# Patient Record
Sex: FEMALE | Race: WHITE | NOT HISPANIC OR LATINO
[De-identification: names, ages, dates, MRNs, and addresses within clinical notes are randomized per-mention and may not be internally consistent; named-entity substitution may affect disease eponyms.]

---
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
## 2023-05-23 ENCOUNTER — APPOINTMENT (OUTPATIENT)
Dept: PEDIATRICS | Facility: CLINIC | Age: 18
End: 2023-05-23
Payer: COMMERCIAL

## 2023-05-23 VITALS
HEART RATE: 78 BPM | SYSTOLIC BLOOD PRESSURE: 114 MMHG | BODY MASS INDEX: 26.79 KG/M2 | DIASTOLIC BLOOD PRESSURE: 64 MMHG | OXYGEN SATURATION: 99 % | HEIGHT: 56.25 IN | WEIGHT: 120.8 LBS

## 2023-05-23 PROCEDURE — 99173 VISUAL ACUITY SCREEN: CPT | Mod: 59

## 2023-05-23 PROCEDURE — 96127 BRIEF EMOTIONAL/BEHAV ASSMT: CPT

## 2023-05-23 PROCEDURE — 90471 IMMUNIZATION ADMIN: CPT

## 2023-05-23 PROCEDURE — 92551 PURE TONE HEARING TEST AIR: CPT

## 2023-05-23 PROCEDURE — 99395 PREV VISIT EST AGE 18-39: CPT | Mod: 25

## 2023-05-23 PROCEDURE — 90715 TDAP VACCINE 7 YRS/> IM: CPT

## 2023-05-23 PROCEDURE — 96160 PT-FOCUSED HLTH RISK ASSMT: CPT | Mod: 59

## 2023-05-23 NOTE — DISCUSSION/SUMMARY
[FreeTextEntry1] : Continue and/or try to have a balanced diet with all food groups. Brush teeth twice a day with toothbrush. Recommend visit to dentist. Maintain consistent daily routines and sleep schedule. Risky behaviors assessed. School discussed. Try to limit screen time to no more than 2 hours per day. Encourage physical activity.\par Return 1 year for routine well visit check.\par coordination of care reviewed\par 5-2-1-0 reviewed\par cardiac checklist -reviewed\par CRAFFT screening was reviewed\par

## 2023-05-23 NOTE — HISTORY OF PRESENT ILLNESS
[Yes] : Patient goes to dentist yearly [Up to date] : Up to date [Normal] : normal [Eats meals with family] : eats meals with family [Sleep Concerns] : no sleep concerns [Normal Performance] : normal performance [Eats regular meals including adequate fruits and vegetables] : eats regular meals including adequate fruits and vegetables [Drinks non-sweetened liquids] : drinks non-sweetened liquids  [Has concerns about body or appearance] : does not have concerns about body or appearance [At least 1 hour of physical activity a day] : at least 1 hour of physical activity a day [Uses electronic nicotine delivery system] : does not use electronic nicotine delivery system [Uses tobacco] : does not use tobacco [Uses drugs] : does not use drugs  [Drinks alcohol] : does not drink alcohol [No] : No cigarette smoke exposure [Has problems with sleep] : does not have problems with sleep [Gets depressed, anxious, or irritable/has mood swings] : does not get depressed, anxious, or irritable/has mood swings [Has thought about hurting self or considered suicide] : has not thought about hurting self or considered suicide [FreeTextEntry7] : 19 y/o C  [de-identified] : discussed with patient [FreeTextEntry1] : parent/patient denies- night sweats, night pains,  unexplained weight loss, headache, chest pain, SOB, loss of energy, chronic joint pains\par patient has normal urine output and stooling\par

## 2023-06-02 NOTE — HISTORY OF PRESENT ILLNESS
Please advise [de-identified] : weight check  pt doing well per pt mom would like RX for blood work  [FreeTextEntry6] : has been able to maintain her weight at school- does exercise a lot due to roomate does , is trying to get protein into diet- not always easy at college\par no diarrhea, vomiting, menses normal

## 2024-05-14 ENCOUNTER — APPOINTMENT (OUTPATIENT)
Dept: PEDIATRICS | Facility: CLINIC | Age: 19
End: 2024-05-14
Payer: COMMERCIAL

## 2024-05-14 VITALS
HEIGHT: 66.25 IN | WEIGHT: 122.9 LBS | HEART RATE: 61 BPM | BODY MASS INDEX: 19.75 KG/M2 | SYSTOLIC BLOOD PRESSURE: 112 MMHG | DIASTOLIC BLOOD PRESSURE: 64 MMHG | OXYGEN SATURATION: 99 %

## 2024-05-14 DIAGNOSIS — Z00.00 ENCOUNTER FOR GENERAL ADULT MEDICAL EXAMINATION W/OUT ABNORMAL FINDINGS: ICD-10-CM

## 2024-05-14 PROCEDURE — 96127 BRIEF EMOTIONAL/BEHAV ASSMT: CPT

## 2024-05-14 PROCEDURE — 96160 PT-FOCUSED HLTH RISK ASSMT: CPT | Mod: 59

## 2024-05-14 PROCEDURE — 92551 PURE TONE HEARING TEST AIR: CPT

## 2024-05-14 PROCEDURE — 99173 VISUAL ACUITY SCREEN: CPT | Mod: 59

## 2024-05-14 PROCEDURE — 99395 PREV VISIT EST AGE 18-39: CPT

## 2024-05-14 NOTE — HISTORY OF PRESENT ILLNESS
[Up to date] : Up to date [Normal] : normal [Eats meals with family] : eats meals with family [Sleep Concerns] : no sleep concerns [Normal Performance] : normal performance [Eats regular meals including adequate fruits and vegetables] : eats regular meals including adequate fruits and vegetables [Has concerns about body or appearance] : does not have concerns about body or appearance [At least 1 hour of physical activity a day] : at least 1 hour of physical activity a day [Uses electronic nicotine delivery system] : does not use electronic nicotine delivery system [Uses tobacco] : does not use tobacco [Uses drugs] : does not use drugs  [Drinks alcohol] : does not drink alcohol [No] : No cigarette smoke exposure [Yes] : Patient has had sexual intercourse. 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

## 2024-05-14 NOTE — DISCUSSION/SUMMARY
[] : The components of the vaccine(s) to be administered today are listed in the plan of care. The disease(s) for which the vaccine(s) are intended to prevent and the risks have been discussed with the caretaker.  The risks are also included in the appropriate vaccination information statements which have been provided to the patient's caregiver.  The caregiver has given consent to vaccinate. [FreeTextEntry1] : Continue and/or try to have a balanced diet with all food groups. Brush teeth twice a day with toothbrush. Recommend visit to dentist. Maintain consistent daily routines and sleep schedule. Risky behaviors assessed. School discussed. Try to limit screen time to no more than 2 hours per day. Encourage physical activity. Return 1 year for routine well visit check. coordination of care reviewed 5-2-1-0 reviewed cardiac checklist -reviewed CRAFFT screening was reviewed

## 2024-06-02 ENCOUNTER — OFFICE (OUTPATIENT)
Dept: URBAN - METROPOLITAN AREA CLINIC 12 | Facility: CLINIC | Age: 19
Setting detail: OPHTHALMOLOGY
End: 2024-06-02
Payer: COMMERCIAL

## 2024-06-02 DIAGNOSIS — H52.13: ICD-10-CM

## 2024-06-02 PROCEDURE — SCREF LASIK EVAL: Performed by: OPHTHALMOLOGY

## 2024-06-02 ASSESSMENT — LID EXAM ASSESSMENTS
OS_BLEPHARITIS: T
OD_BLEPHARITIS: T

## 2024-06-02 ASSESSMENT — CONFRONTATIONAL VISUAL FIELD TEST (CVF)
OD_FINDINGS: FULL
OS_FINDINGS: FULL

## 2024-06-13 ENCOUNTER — OFFICE (OUTPATIENT)
Dept: URBAN - METROPOLITAN AREA CLINIC 12 | Facility: CLINIC | Age: 19
Setting detail: OPHTHALMOLOGY
End: 2024-06-13
Payer: COMMERCIAL

## 2024-06-13 DIAGNOSIS — H52.13: ICD-10-CM

## 2024-06-13 PROCEDURE — SCREF LASIK EVAL: Performed by: OPHTHALMOLOGY

## 2024-06-13 ASSESSMENT — LID EXAM ASSESSMENTS
OD_BLEPHARITIS: T
OS_BLEPHARITIS: T

## 2024-06-13 ASSESSMENT — CONFRONTATIONAL VISUAL FIELD TEST (CVF)
OS_FINDINGS: FULL
OD_FINDINGS: FULL

## 2024-06-19 ENCOUNTER — OTHER LOCATION (OUTPATIENT)
Dept: URBAN - METROPOLITAN AREA LASIK CENTER 6 | Facility: LASIK CENTER | Age: 19
Setting detail: OPHTHALMOLOGY
End: 2024-06-19

## 2024-06-19 ENCOUNTER — RX ONLY (RX ONLY)
Age: 19
End: 2024-06-19

## 2024-06-19 DIAGNOSIS — H52.13: ICD-10-CM

## 2024-06-19 PROCEDURE — 65760 KERATOMILEUSIS: CPT | Mod: RT,LT | Performed by: OPHTHALMOLOGY

## 2024-06-20 ENCOUNTER — OFFICE (OUTPATIENT)
Dept: URBAN - METROPOLITAN AREA CLINIC 12 | Facility: CLINIC | Age: 19
Setting detail: OPHTHALMOLOGY
End: 2024-06-20
Payer: COMMERCIAL

## 2024-06-20 DIAGNOSIS — H52.13: ICD-10-CM

## 2024-06-20 PROCEDURE — 99024 POSTOP FOLLOW-UP VISIT: CPT | Performed by: OPHTHALMOLOGY

## 2024-06-20 ASSESSMENT — LID EXAM ASSESSMENTS
OD_BLEPHARITIS: T
OS_BLEPHARITIS: T

## 2024-06-20 ASSESSMENT — CONFRONTATIONAL VISUAL FIELD TEST (CVF)
OS_FINDINGS: FULL
OD_FINDINGS: FULL

## 2024-06-26 ENCOUNTER — OFFICE (OUTPATIENT)
Dept: URBAN - METROPOLITAN AREA CLINIC 12 | Facility: CLINIC | Age: 19
Setting detail: OPHTHALMOLOGY
End: 2024-06-26
Payer: COMMERCIAL

## 2024-06-26 DIAGNOSIS — H52.13: ICD-10-CM

## 2024-06-26 PROCEDURE — 99024 POSTOP FOLLOW-UP VISIT: CPT | Performed by: OPHTHALMOLOGY

## 2024-06-26 ASSESSMENT — CONFRONTATIONAL VISUAL FIELD TEST (CVF)
OD_FINDINGS: FULL
OS_FINDINGS: FULL

## 2024-06-26 ASSESSMENT — LID EXAM ASSESSMENTS
OD_BLEPHARITIS: T
OS_BLEPHARITIS: T

## 2024-07-21 ENCOUNTER — OFFICE (OUTPATIENT)
Dept: URBAN - METROPOLITAN AREA CLINIC 12 | Facility: CLINIC | Age: 19
Setting detail: OPHTHALMOLOGY
End: 2024-07-21
Payer: COMMERCIAL

## 2024-07-21 DIAGNOSIS — H52.13: ICD-10-CM

## 2024-07-21 PROCEDURE — 99024 POSTOP FOLLOW-UP VISIT: CPT | Performed by: OPHTHALMOLOGY

## 2024-07-21 ASSESSMENT — CONFRONTATIONAL VISUAL FIELD TEST (CVF)
OS_FINDINGS: FULL
OD_FINDINGS: FULL

## 2024-07-21 ASSESSMENT — LID EXAM ASSESSMENTS
OD_BLEPHARITIS: T
OS_BLEPHARITIS: T

## 2025-01-06 ENCOUNTER — OFFICE (OUTPATIENT)
Dept: URBAN - METROPOLITAN AREA CLINIC 12 | Facility: CLINIC | Age: 20
Setting detail: OPHTHALMOLOGY
End: 2025-01-06
Payer: COMMERCIAL

## 2025-01-06 ENCOUNTER — RX ONLY (RX ONLY)
Age: 20
End: 2025-01-06

## 2025-01-06 DIAGNOSIS — H52.13: ICD-10-CM

## 2025-01-06 PROCEDURE — 99024 POSTOP FOLLOW-UP VISIT: CPT | Performed by: OPHTHALMOLOGY

## 2025-01-06 ASSESSMENT — PACHYMETRY
OS_CT_CORRECTION: 1
OS_CT_UM: 535
OD_CT_CORRECTION: 1
OD_CT_UM: 525

## 2025-01-06 ASSESSMENT — REFRACTION_AUTOREFRACTION
OD_SPHERE: -2.25
OD_CYLINDER: +0.25
OD_AXIS: 108
OD_CYLINDER: SPHERE
OS_SPHERE: -2.00
OS_CYLINDER: -0.25
OD_SPHERE: -0.50
OS_SPHERE: +0.25
OS_CYLINDER: +0.25
OS_AXIS: 073
OD_AXIS: 000
OS_AXIS: 78

## 2025-01-06 ASSESSMENT — REFRACTION_MANIFEST
OD_VA1: 20/20
OD_AXIS: 170
OD_CYLINDER: -0.25
OD_SPHERE: -4.00
OD_AXIS: 180
OS_SPHERE: -3.75
OD_SPHERE: -4.00
OD_CYLINDER: -0.50
OS_AXIS: 000
OS_VA1: 20/20
OD_VA1: 20/20
OS_VA1: 20/20
OS_CYLINDER: SPHERE
OS_CYLINDER: -0.50
OS_SPHERE: -3.75
OS_AXIS: 015

## 2025-01-06 ASSESSMENT — KERATOMETRY
METHOD_AUTO_MANUAL: AUTO
OS_K2POWER_DIOPTERS: 41.00
OS_K1POWER_DIOPTERS: 40.75
OD_AXISANGLE_DEGREES: 092
OS_AXISANGLE_DEGREES: 104
OD_K1POWER_DIOPTERS: 40.25
OD_K2POWER_DIOPTERS: 40.50

## 2025-01-06 ASSESSMENT — VISUAL ACUITY
OD_BCVA: 20/20
OS_BCVA: 20/20-

## 2025-01-06 ASSESSMENT — CONFRONTATIONAL VISUAL FIELD TEST (CVF)
OD_FINDINGS: FULL
OS_FINDINGS: FULL

## 2025-01-06 ASSESSMENT — REFRACTION_CURRENTRX
OS_CYLINDER: SPHERE
OS_SPHERE: -3.50
OS_AXIS: 0
OD_AXIS: 0
OD_CYLINDER: SPHERE
OD_VPRISM_DIRECTION: SV
OS_VPRISM_DIRECTION: SV
OS_OVR_VA: 20/
OD_OVR_VA: 20/
OD_SPHERE: -4.25

## 2025-01-06 ASSESSMENT — LID EXAM ASSESSMENTS
OD_BLEPHARITIS: T
OS_BLEPHARITIS: T

## 2025-01-06 ASSESSMENT — TONOMETRY
OD_IOP_MMHG: 10
OS_IOP_MMHG: 11